# Patient Record
Sex: FEMALE | Race: WHITE | NOT HISPANIC OR LATINO
[De-identification: names, ages, dates, MRNs, and addresses within clinical notes are randomized per-mention and may not be internally consistent; named-entity substitution may affect disease eponyms.]

---

## 2021-10-04 PROBLEM — Z00.00 ENCOUNTER FOR PREVENTIVE HEALTH EXAMINATION: Status: ACTIVE | Noted: 2021-10-04

## 2021-10-05 ENCOUNTER — APPOINTMENT (OUTPATIENT)
Dept: CARDIOLOGY | Facility: CLINIC | Age: 51
End: 2021-10-05

## 2021-10-07 ENCOUNTER — EMERGENCY (EMERGENCY)
Facility: HOSPITAL | Age: 51
LOS: 1 days | Discharge: LEFT BEFORE DISCHARGE | End: 2021-10-07
Payer: COMMERCIAL

## 2021-10-07 VITALS
HEART RATE: 118 BPM | TEMPERATURE: 100 F | HEIGHT: 69 IN | SYSTOLIC BLOOD PRESSURE: 121 MMHG | DIASTOLIC BLOOD PRESSURE: 63 MMHG | WEIGHT: 134.48 LBS | RESPIRATION RATE: 18 BRPM | OXYGEN SATURATION: 97 %

## 2021-10-07 PROCEDURE — L9991: CPT

## 2021-10-07 NOTE — ED ADULT TRIAGE NOTE - CHIEF COMPLAINT QUOTE
Ambulatory from  complaining of dyspnea on exertion. Patient seen at  today, diagnosed with pneumonia however states that they told her to come to the hospital for a chest CT and IV ABx. Patient saturating well on RA, double vaxxed for COVID. NAD, no accessory muscle use, current every day smoker.

## 2022-07-09 ENCOUNTER — TELEPHONE ENCOUNTER (OUTPATIENT)
Dept: URBAN - METROPOLITAN AREA CLINIC 121 | Facility: CLINIC | Age: 52
End: 2022-07-09

## 2022-07-10 ENCOUNTER — TELEPHONE ENCOUNTER (OUTPATIENT)
Dept: URBAN - METROPOLITAN AREA CLINIC 121 | Facility: CLINIC | Age: 52
End: 2022-07-10

## 2022-07-20 ENCOUNTER — EMERGENCY (EMERGENCY)
Facility: HOSPITAL | Age: 52
LOS: 1 days | Discharge: LEFT BEFORE DISCHARGE | End: 2022-07-20
Admitting: EMERGENCY MEDICINE

## 2022-07-20 VITALS
DIASTOLIC BLOOD PRESSURE: 53 MMHG | SYSTOLIC BLOOD PRESSURE: 105 MMHG | HEART RATE: 99 BPM | HEIGHT: 69 IN | TEMPERATURE: 99 F | OXYGEN SATURATION: 98 % | RESPIRATION RATE: 18 BRPM

## 2022-07-20 PROCEDURE — L9991: CPT
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
## 2023-05-20 ENCOUNTER — OFFICE (OUTPATIENT)
Dept: URBAN - METROPOLITAN AREA CLINIC 6 | Facility: CLINIC | Age: 53
Setting detail: OPHTHALMOLOGY
End: 2023-05-20
Payer: COMMERCIAL

## 2023-05-20 DIAGNOSIS — H25.13: ICD-10-CM

## 2023-05-20 DIAGNOSIS — H35.363: ICD-10-CM

## 2023-05-20 DIAGNOSIS — S00.12XA: ICD-10-CM

## 2023-05-20 DIAGNOSIS — H35.362: ICD-10-CM

## 2023-05-20 DIAGNOSIS — H52.4: ICD-10-CM

## 2023-05-20 DIAGNOSIS — H35.361: ICD-10-CM

## 2023-05-20 PROCEDURE — 92004 COMPRE OPH EXAM NEW PT 1/>: CPT | Performed by: OPHTHALMOLOGY

## 2023-05-20 PROCEDURE — 92015 DETERMINE REFRACTIVE STATE: CPT | Performed by: OPHTHALMOLOGY

## 2023-05-20 ASSESSMENT — REFRACTION_MANIFEST
OS_VA1: 20/20-1
OD_CYLINDER: -1.50
OD_AXIS: 95
OD_SPHERE: +3.50
OD_VA1: 20/20-1
OS_SPHERE: +2.75
OD_AXIS: 95
OD_VA1: 20/20-1
OS_VA1: 20/20-1
OS_CYLINDER: -0.25
OS_ADD: +2.00
OD_CYLINDER: -1.50
OS_AXIS: 90
OD_SPHERE: +3.50
OD_ADD: +2.00
OU_VA: 20/20-1
OS_SPHERE: +2.75
OU_VA: 20/20-1
OS_AXIS: 90
OS_CYLINDER: -0.25

## 2023-05-20 ASSESSMENT — KERATOMETRY
OS_K1POWER_DIOPTERS: 42.25
METHOD_AUTO_MANUAL: AUTO
OS_AXISANGLE_DEGREES: 90
OD_K1POWER_DIOPTERS: 42.00
OD_AXISANGLE_DEGREES: 90
OS_K2POWER_DIOPTERS: 42.25
OD_K2POWER_DIOPTERS: 42.00

## 2023-05-20 ASSESSMENT — AXIALLENGTH_DERIVED
OS_AL: 22.9951
OD_AL: 23.0827
OS_AL: 23.0415
OS_AL: 23.0415
OD_AL: 23.0827
OD_AL: 22.9437

## 2023-05-20 ASSESSMENT — REFRACTION_AUTOREFRACTION
OD_AXIS: 95
OD_CYLINDER: -1.25
OS_CYLINDER: -0.50
OD_SPHERE: +3.75
OS_AXIS: 90
OS_SPHERE: +3.00

## 2023-05-20 ASSESSMENT — TONOMETRY
OD_IOP_MMHG: 15
OS_IOP_MMHG: 15

## 2023-05-20 ASSESSMENT — REFRACTION_CURRENTRX
OS_SPHERE: +2.75
OD_VPRISM_DIRECTION: SV
OD_OVR_VA: 20/
OS_OVR_VA: 20/
OS_VPRISM_DIRECTION: SV
OD_SPHERE: +2.75

## 2023-05-20 ASSESSMENT — SPHEQUIV_DERIVED
OD_SPHEQUIV: 2.75
OD_SPHEQUIV: 3.125
OS_SPHEQUIV: 2.625
OD_SPHEQUIV: 2.75
OS_SPHEQUIV: 2.625
OS_SPHEQUIV: 2.75

## 2023-05-20 ASSESSMENT — VISUAL ACUITY
OS_BCVA: 20/70
OD_BCVA: 20/100

## 2023-05-20 ASSESSMENT — CONFRONTATIONAL VISUAL FIELD TEST (CVF)
OS_FINDINGS: FULL
OD_FINDINGS: FULL

## 2024-06-28 NOTE — ED ADULT NURSE NOTE - NSSEPSISSUSPECTED_ED_A_ED
Patient contacted [facility] clinic to provide update patient had surgery and was instructed to hold warfarin until 7/1.  Patient will maintain AMS appointment 7/5 and call in the interim with any updates.   
Unable to Assess: Patient left before being evaluated

## 2025-03-05 ENCOUNTER — EMERGENCY (EMERGENCY)
Facility: HOSPITAL | Age: 55
LOS: 1 days | Discharge: LEFT BEFORE DISCHARGE | End: 2025-03-05
Attending: EMERGENCY MEDICINE | Admitting: EMERGENCY MEDICINE
Payer: COMMERCIAL

## 2025-03-05 VITALS
RESPIRATION RATE: 18 BRPM | OXYGEN SATURATION: 97 % | TEMPERATURE: 98 F | SYSTOLIC BLOOD PRESSURE: 98 MMHG | WEIGHT: 139.99 LBS | DIASTOLIC BLOOD PRESSURE: 66 MMHG | HEIGHT: 69 IN | HEART RATE: 83 BPM

## 2025-03-05 PROCEDURE — 99285 EMERGENCY DEPT VISIT HI MDM: CPT

## 2025-03-05 PROCEDURE — 82962 GLUCOSE BLOOD TEST: CPT

## 2025-03-05 PROCEDURE — 99283 EMERGENCY DEPT VISIT LOW MDM: CPT

## 2025-03-05 NOTE — ED PROVIDER NOTE - PROGRESS NOTE DETAILS
Had an at length discussion with patient.  Patient wishes to leave at this time.  The patient understands that they are leaving against medical advice despite the risk of missing a potentially serious diagnosis which may lead to injury, disability and/or death.  I discussed with the patient which tests would need to be performed and what type of monitoring would be necessary for the patient as well.  I was unable to convince patient to stay for further workup.  The patient was given an opportunity to ask any questions as well.   The patient demonstrates understanding of all risks, is awake and alert and demonstrates competance to make medical decisions.  The patient understands that they may return at any time if desired. Discussed the importance of close, prompt medical follow-up.  Friend picked pt up and assumed care. Please see MDM for full discussion Patient was unable to obtain a friend to pick her up continue to refuse labs and imaging.  Again was offered food refused.  Was evaluated in the ER for several hours became clinically sober ambulated no slurred speech was able to call for a ride herself and was discharged AGAINST MEDICAL ADVICE.

## 2025-03-05 NOTE — ED ADULT NURSE NOTE - NSFALLUNIVINTERV_ED_ALL_ED
Bed/Stretcher in lowest position, wheels locked, appropriate side rails in place/Call bell, personal items and telephone in reach/Instruct patient to call for assistance before getting out of bed/chair/stretcher/Non-slip footwear applied when patient is off stretcher/[place] to call system/Physically safe environment - no spills, clutter or unnecessary equipment/Purposeful proactive rounding/Room/bathroom lighting operational, light cord in reach
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
## 2025-03-05 NOTE — ED ADULT TRIAGE NOTE - BEFAST ARM NUMBNESS
No [FreeTextEntry1] : Referring Physician: Siddhartha De Los Santos MD\par \par Dear Dr. De Los Santos:\par  \par Mrs. Mcnamara was seen in the [facility] today. For our records, please allow me to summarize the history and my findings.\par  \par This pleasant 76 year old woman has a cardiovascular history significant for atrial fibrillation s/p DCCV on Eliquis. She noted she was found to be in AF over Memorial Day Weekend in 2022. She felt off that day and went to the ED and was found to be in AF with RVR. She underwent a JAZZMINE/DCCV/ She had significant vaginal bleeding on Eliquis. She underwent an extensive GYN work up which to date has been negative. She noted the bleeding stopped when she stopped Eliquis. She does not want to be on AC. She had an ILR placed, and recently had 4 hours of AF. She continue to decline AC, Watchman, or PHILIPP ligation. Dr De Los Santos had wanted to initiate Flecainide on her, but she does not want to be on it due to the side effects she read online. She instead was given Multaq, and states she had every side effect mentioned for the medication. She now is only taking it once a day which she says is working better for her. \par  \par Mrs. Mcnamara denies any recent history of chest pain, shortness of breath, palpitations, dizziness, or syncope.\par
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
## 2025-03-05 NOTE — ED PROVIDER NOTE - PATIENT PORTAL LINK FT
You can access the FollowMyHealth Patient Portal offered by [facility] by registering at the following website: [email or web address]. By joining [employer]’s FollowMyHealth portal, you will also be able to view your health information using other applications (apps) compatible with our system.

## 2025-03-05 NOTE — ED PROVIDER NOTE - NSFOLLOWUPINSTRUCTIONS_ED_ALL_ED_FT
Return to the ED for any new or worsening symptoms  Take your medication as prescribed  Make sure to remain hydrated and do not skip meals  Advance activity as tolerated  Please feel free to return to the ED at anytime for any reason  Follow up with your PMD in 1-2 days for a recheck
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
## 2025-03-05 NOTE — ED PROVIDER NOTE - DIFFERENTIAL DIAGNOSIS
Patient is refusing any medical intervention including lab draw IV hydration.  Offered a meal refusing.  Reports she called her friend who is going to come pick her up and she just wants to leave.  Currently awake alert oriented x 3 ambulatory with steady gait advised that without being able to examine her at this would be AGAINST MEDICAL ADVICE.  Patient states she has doctors appointment to go to today. Differential Diagnosis

## 2025-04-09 ENCOUNTER — EMERGENCY (EMERGENCY)
Facility: HOSPITAL | Age: 55
LOS: 1 days | End: 2025-04-09
Payer: COMMERCIAL

## 2025-04-09 VITALS
HEIGHT: 69 IN | HEART RATE: 98 BPM | RESPIRATION RATE: 20 BRPM | OXYGEN SATURATION: 98 % | DIASTOLIC BLOOD PRESSURE: 75 MMHG | WEIGHT: 139.99 LBS | SYSTOLIC BLOOD PRESSURE: 129 MMHG | TEMPERATURE: 98 F

## 2025-04-09 PROCEDURE — L9991: CPT
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
## 2025-04-09 NOTE — ED ADULT TRIAGE NOTE - ARRIVAL FROM
Moises Ormond  : 1947 was seen for Diabetic Education Follow up:    Date: 10/6/2019  Referring Provider:Dr. Jose Guadalupe Olsen  Start time: 11:30am End time: 12:00pm    Assessment:     Assessment: /62 (BP Location: Left arm, Patient Position: S duration    MONITORING:  - Type of meter:Zain Contour Next EZ  - Demonstration/Return Demo:  B mg/dL,  hr.p.p  - Testing Schedule: fasting only    TAKING MEDICATION:  Oral Agents:   Type/Dose/Schedule: Metformin 1000 mg 1 tab twice daily  Action/Side Doctor's office

## 2025-05-16 NOTE — ED ADULT TRIAGE NOTE - NS_CALLED_NO_RESPONSE_ED_ALL_ED
Hemodynamically stable.  No further complaints of SOB and Chest pain.  No bleeding and hematoma at right groin good pulses.  Problem: Adult Inpatient Plan of Care  Goal: Plan of Care Review  Outcome: Progressing  Goal: Patient-Specific Goal (Individualized)  Outcome: Progressing  Goal: Absence of Hospital-Acquired Illness or Injury  Outcome: Progressing  Goal: Optimal Comfort and Wellbeing  Outcome: Progressing  Goal: Readiness for Transition of Care  Outcome: Progressing     Problem: Cardiac Catheterization (Diagnostic/Interventional)  Goal: Absence of Bleeding  Outcome: Met  Goal: Absence of Contrast-Induced Injury  Outcome: Met  Goal: Stable Heart Rate and Rhythm  Outcome: Met  Goal: Absence of Embolism Signs and Symptoms  Outcome: Met  Goal: Anesthesia/Sedation Recovery  Outcome: Met  Goal: Optimal Pain Control and Function  Outcome: Met  Goal: Absence of Vascular Access Complication  Outcome: Met     Problem: Chest Pain  Goal: Resolution of Chest Pain Symptoms  Outcome: Progressing     Problem: Hypertension Acute  Goal: Blood Pressure Within Desired Range  Outcome: Progressing      Patient called and no answer